# Patient Record
Sex: FEMALE | Race: WHITE | NOT HISPANIC OR LATINO | Employment: UNEMPLOYED | RURAL
[De-identification: names, ages, dates, MRNs, and addresses within clinical notes are randomized per-mention and may not be internally consistent; named-entity substitution may affect disease eponyms.]

---
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
## 2023-02-02 ENCOUNTER — OFFICE VISIT (OUTPATIENT)
Dept: FAMILY MEDICINE | Facility: CLINIC | Age: 18
End: 2023-02-02
Payer: MEDICAID

## 2023-02-02 VITALS
DIASTOLIC BLOOD PRESSURE: 79 MMHG | BODY MASS INDEX: 24.27 KG/M2 | OXYGEN SATURATION: 98 % | HEIGHT: 66 IN | SYSTOLIC BLOOD PRESSURE: 130 MMHG | TEMPERATURE: 99 F | WEIGHT: 151 LBS | HEART RATE: 100 BPM

## 2023-02-02 DIAGNOSIS — N39.0 URINARY TRACT INFECTION WITH HEMATURIA, SITE UNSPECIFIED: Primary | ICD-10-CM

## 2023-02-02 DIAGNOSIS — N89.8 VAGINAL ODOR: ICD-10-CM

## 2023-02-02 DIAGNOSIS — R31.9 URINARY TRACT INFECTION WITH HEMATURIA, SITE UNSPECIFIED: Primary | ICD-10-CM

## 2023-02-02 DIAGNOSIS — Z20.2 STD EXPOSURE: ICD-10-CM

## 2023-02-02 DIAGNOSIS — F32.1 CURRENT MODERATE EPISODE OF MAJOR DEPRESSIVE DISORDER, UNSPECIFIED WHETHER RECURRENT: ICD-10-CM

## 2023-02-02 LAB
BILIRUB SERPL-MCNC: NORMAL MG/DL
BILIRUB UR QL STRIP: NEGATIVE
BLOOD URINE, POC: NORMAL
CANDIDA SPECIES: NEGATIVE
CLARITY UR: ABNORMAL
COLOR UR: ABNORMAL
COLOR, POC UA: NORMAL
GARDNERELLA: POSITIVE
GLUCOSE UR QL STRIP: NORMAL
GLUCOSE UR STRIP-MCNC: NORMAL MG/DL
KETONES UR QL STRIP: NORMAL
KETONES UR STRIP-SCNC: NEGATIVE MG/DL
LEUKOCYTE ESTERASE UR QL STRIP: NEGATIVE
LEUKOCYTE ESTERASE URINE, POC: NORMAL
NITRITE UR QL STRIP: NEGATIVE
NITRITE, POC UA: NORMAL
PH UR STRIP: 6 PH UNITS
PH, POC UA: 6
PROT UR QL STRIP: 10
PROTEIN, POC: NORMAL
RBC # UR STRIP: NEGATIVE /UL
SP GR UR STRIP: 1.03
SPECIFIC GRAVITY, POC UA: 1.03
TRICHOMONAS: NEGATIVE
UROBILINOGEN UR STRIP-ACNC: NORMAL MG/DL
UROBILINOGEN, POC UA: 1

## 2023-02-02 PROCEDURE — 3075F SYST BP GE 130 - 139MM HG: CPT | Mod: CPTII,,, | Performed by: NURSE PRACTITIONER

## 2023-02-02 PROCEDURE — 99213 PR OFFICE/OUTPT VISIT, EST, LEVL III, 20-29 MIN: ICD-10-PCS | Mod: ,,, | Performed by: NURSE PRACTITIONER

## 2023-02-02 PROCEDURE — 81003 URINALYSIS, REFLEX TO URINE CULTURE: ICD-10-PCS | Mod: QW,,, | Performed by: CLINICAL MEDICAL LABORATORY

## 2023-02-02 PROCEDURE — 1159F PR MEDICATION LIST DOCUMENTED IN MEDICAL RECORD: ICD-10-PCS | Mod: CPTII,,, | Performed by: NURSE PRACTITIONER

## 2023-02-02 PROCEDURE — 87591 N.GONORRHOEAE DNA AMP PROB: CPT | Mod: ,,, | Performed by: CLINICAL MEDICAL LABORATORY

## 2023-02-02 PROCEDURE — 87491 CHLMYD TRACH DNA AMP PROBE: CPT | Mod: ,,, | Performed by: CLINICAL MEDICAL LABORATORY

## 2023-02-02 PROCEDURE — 3075F PR MOST RECENT SYSTOLIC BLOOD PRESS GE 130-139MM HG: ICD-10-PCS | Mod: CPTII,,, | Performed by: NURSE PRACTITIONER

## 2023-02-02 PROCEDURE — 87480 BACTERIAL VAGINOSIS: ICD-10-PCS | Mod: ,,, | Performed by: CLINICAL MEDICAL LABORATORY

## 2023-02-02 PROCEDURE — 1160F RVW MEDS BY RX/DR IN RCRD: CPT | Mod: CPTII,,, | Performed by: NURSE PRACTITIONER

## 2023-02-02 PROCEDURE — 1159F MED LIST DOCD IN RCRD: CPT | Mod: CPTII,,, | Performed by: NURSE PRACTITIONER

## 2023-02-02 PROCEDURE — 87591 CHLAMYDIA/GONORRHOEAE(GC), PCR: ICD-10-PCS | Mod: ,,, | Performed by: CLINICAL MEDICAL LABORATORY

## 2023-02-02 PROCEDURE — 87510 GARDNER VAG DNA DIR PROBE: CPT | Mod: ,,, | Performed by: CLINICAL MEDICAL LABORATORY

## 2023-02-02 PROCEDURE — 81003 URINALYSIS AUTO W/O SCOPE: CPT | Mod: RHCUB | Performed by: NURSE PRACTITIONER

## 2023-02-02 PROCEDURE — 1160F PR REVIEW ALL MEDS BY PRESCRIBER/CLIN PHARMACIST DOCUMENTED: ICD-10-PCS | Mod: CPTII,,, | Performed by: NURSE PRACTITIONER

## 2023-02-02 PROCEDURE — 99213 OFFICE O/P EST LOW 20 MIN: CPT | Mod: ,,, | Performed by: NURSE PRACTITIONER

## 2023-02-02 PROCEDURE — 3008F BODY MASS INDEX DOCD: CPT | Mod: CPTII,,, | Performed by: NURSE PRACTITIONER

## 2023-02-02 PROCEDURE — 87660 BACTERIAL VAGINOSIS: ICD-10-PCS | Mod: ,,, | Performed by: CLINICAL MEDICAL LABORATORY

## 2023-02-02 PROCEDURE — 3078F PR MOST RECENT DIASTOLIC BLOOD PRESSURE < 80 MM HG: ICD-10-PCS | Mod: CPTII,,, | Performed by: NURSE PRACTITIONER

## 2023-02-02 PROCEDURE — 81003 URINALYSIS AUTO W/O SCOPE: CPT | Mod: QW,,, | Performed by: CLINICAL MEDICAL LABORATORY

## 2023-02-02 PROCEDURE — 3078F DIAST BP <80 MM HG: CPT | Mod: CPTII,,, | Performed by: NURSE PRACTITIONER

## 2023-02-02 PROCEDURE — 87480 CANDIDA DNA DIR PROBE: CPT | Mod: ,,, | Performed by: CLINICAL MEDICAL LABORATORY

## 2023-02-02 PROCEDURE — 3008F PR BODY MASS INDEX (BMI) DOCUMENTED: ICD-10-PCS | Mod: CPTII,,, | Performed by: NURSE PRACTITIONER

## 2023-02-02 PROCEDURE — 87510 BACTERIAL VAGINOSIS: ICD-10-PCS | Mod: ,,, | Performed by: CLINICAL MEDICAL LABORATORY

## 2023-02-02 PROCEDURE — 87491 CHLAMYDIA/GONORRHOEAE(GC), PCR: ICD-10-PCS | Mod: ,,, | Performed by: CLINICAL MEDICAL LABORATORY

## 2023-02-02 PROCEDURE — 87660 TRICHOMONAS VAGIN DIR PROBE: CPT | Mod: ,,, | Performed by: CLINICAL MEDICAL LABORATORY

## 2023-02-02 RX ORDER — CITALOPRAM 20 MG/1
TABLET, FILM COATED ORAL
Qty: 173 TABLET | Refills: 0 | Status: SHIPPED | OUTPATIENT
Start: 2023-02-02 | End: 2023-07-26

## 2023-02-03 DIAGNOSIS — B96.89 BACTERIAL VAGINOSIS: Primary | ICD-10-CM

## 2023-02-03 DIAGNOSIS — N76.0 BACTERIAL VAGINOSIS: Primary | ICD-10-CM

## 2023-02-03 LAB
CHLAMYDIA BY PCR: NEGATIVE
N. GONORRHOEAE (GC) BY PCR: NEGATIVE

## 2023-02-03 RX ORDER — METRONIDAZOLE 500 MG/1
500 TABLET ORAL EVERY 12 HOURS
Qty: 14 TABLET | Refills: 0 | Status: SHIPPED | OUTPATIENT
Start: 2023-02-03 | End: 2023-02-10

## 2023-02-06 NOTE — PROGRESS NOTES
MADDIE Soriano   [place] [address] [place]  [address]     PATIENT NAME: Kezia Colon  : 2005  DATE: 23  MRN: [id number]      Billing Provider: MADDIE Soriano  Level of Service:   Patient PCP Information       Provider PCP Type    Primary Doctor No General            Reason for Visit / Chief Complaint: Exposure to STD (uti) and Urinary Tract Infection ([place], oder to urine since 2 days)       Update PCP  Update Chief Complaint         History of Present Illness / Problem Focused Workflow     Kezia Colon presents to the clinic with Exposure to STD (uti) and Urinary Tract Infection ([place], oder to urine since 2 days)     18 y/o female presents for burning with urination and vaginal odor x 2 days. Would also like to start on something for depression. She has felt depression for months-years, feels sad most days of the week, not interested in things she feels like she should be interested in, denies SI or HI.     Exposure to STD   The patient's primary symptoms include dysuria.   Urinary Tract Infection       Review of Systems     Review of Systems   Constitutional: Negative.    HENT: Negative.     Eyes: Negative.    Respiratory: Negative.     Cardiovascular: Negative.    Gastrointestinal: Negative.    Endocrine: Negative.    Genitourinary:  Positive for dysuria.        Vaginal odor   Musculoskeletal: Negative.    Integumentary:  Negative.   Neurological: Negative.    Psychiatric/Behavioral:  Positive for decreased concentration. Negative for suicidal ideas.    All other systems reviewed and are negative.     Medical / Social / Family History     Past Medical History:   Diagnosis Date    Depression        History reviewed. No pertinent surgical history.    Social History  Ms. Colon  reports that she has been smoking vaping with nicotine. She has never used smokeless tobacco. She reports current alcohol use. She reports current drug use. Drugs:  Methamphetamines and Marijuana.    Family History  Ms.'s Colon family history is not on file.    Medications and Allergies     Medications  No outpatient medications have been marked as taking for the 2/2/23 encounter (Office Visit) with MADDIE Soriano.       Allergies  Review of patient's allergies indicates:  No Known Allergies    Physical Examination     Vitals:    02/02/23 0946   BP: 130/79   Pulse: 100   Temp: 98.5 °F (36.9 °C)     Physical Exam  Vitals and nursing note reviewed.   Constitutional:       Appearance: Normal appearance. She is normal weight.   HENT:      Head: Normocephalic and atraumatic.      Mouth/Throat:      Mouth: Mucous membranes are moist.      Pharynx: Oropharynx is clear.   Eyes:      Extraocular Movements: Extraocular movements intact.      Conjunctiva/sclera: Conjunctivae normal.      Pupils: Pupils are equal, round, and reactive to light.   Cardiovascular:      Rate and Rhythm: Normal rate and regular rhythm.      Pulses: Normal pulses.      Heart sounds: Normal heart sounds.   Pulmonary:      Effort: Pulmonary effort is normal.      Breath sounds: Normal breath sounds.   Abdominal:      General: Abdomen is flat. Bowel sounds are normal.      Palpations: Abdomen is soft.   Musculoskeletal:         General: Normal range of motion.      Cervical back: Normal range of motion and neck supple.   Skin:     General: Skin is warm and dry.      Capillary Refill: Capillary refill takes less than 2 seconds.   Neurological:      General: No focal deficit present.      Mental Status: She is alert and oriented to person, place, and time. Mental status is at baseline.   Psychiatric:         Attention and Perception: Attention normal.         Mood and Affect: Mood normal. Affect is flat.         Behavior: Behavior normal.         Thought Content: Thought content normal.         Judgment: Judgment normal.            Assessment and Plan (including Health Maintenance)      Problem List  Smart  Sets  Document Outside HM   :    Plan:   Urinary tract infection with hematuria, site unspecified  -     POCT URINALYSIS W/O SCOPE  -     Chlamydia/GC, PCR; Future; Expected date: 02/02/2023    STD exposure  -     POCT URINALYSIS W/O SCOPE  -     Chlamydia/GC, PCR; Future; Expected date: 02/02/2023  -     Urinalysis, Reflex to Urine Culture; Future; Expected date: 02/02/2023  -     Bacterial Vaginosis; Future; Expected date: 02/02/2023    Vaginal odor  -     Bacterial Vaginosis; Future; Expected date: 02/02/2023    Current moderate episode of major depressive disorder, unspecified whether recurrent  -     citalopram (CELEXA) 20 MG tablet; Take 1 tablet (20 mg total) by mouth once daily for 7 days, THEN 2 tablets (40 mg total) once daily.  Dispense: 173 tablet; Refill: 0           Health Maintenance Due   Topic Date Due    Hepatitis C Screening  Never done    Lipid Panel  Never done    COVID-19 Vaccine (1) Never done    Pneumococcal Vaccines (Age 0-64) (1 - PCV) Never done    HPV Vaccines (1 - 2-dose series) Never done    HIV Screening  Never done    TETANUS VACCINE  Never done    Influenza Vaccine (1) Never done       Problem List Items Addressed This Visit    None  Visit Diagnoses       Urinary tract infection with hematuria, site unspecified    -  Primary    Relevant Orders    POCT URINALYSIS W/O SCOPE (Completed)    Chlamydia/GC, PCR (Completed)    STD exposure        Relevant Orders    POCT URINALYSIS W/O SCOPE (Completed)    Chlamydia/GC, PCR (Completed)    Urinalysis, Reflex to Urine Culture (Completed)    Bacterial Vaginosis (Completed)    Vaginal odor        Relevant Orders    Bacterial Vaginosis (Completed)    Current moderate episode of major depressive disorder, unspecified whether recurrent        Relevant Medications    citalopram (CELEXA) 20 MG tablet            The patient has no Health Maintenance topics of status Not Due    Future Appointments   Date Time Provider Department Center   3/2/2023  9:00  MADDIE [facility]        There are no Patient Instructions on file for this visit.  Follow up in about 1 month (around 3/2/2023) for Follow up.     Signature:  MADDIE Soriano      Date of encounter: 2/2/23

## 2023-07-26 ENCOUNTER — APPOINTMENT (OUTPATIENT)
Dept: RADIOLOGY | Facility: CLINIC | Age: 18
End: 2023-07-26
Attending: STUDENT IN AN ORGANIZED HEALTH CARE EDUCATION/TRAINING PROGRAM
Payer: MEDICAID

## 2023-07-26 ENCOUNTER — OFFICE VISIT (OUTPATIENT)
Dept: FAMILY MEDICINE | Facility: CLINIC | Age: 18
End: 2023-07-26
Payer: MEDICAID

## 2023-07-26 VITALS
BODY MASS INDEX: 23.3 KG/M2 | SYSTOLIC BLOOD PRESSURE: 104 MMHG | TEMPERATURE: 99 F | DIASTOLIC BLOOD PRESSURE: 75 MMHG | HEIGHT: 66 IN | WEIGHT: 145 LBS | OXYGEN SATURATION: 97 % | HEART RATE: 88 BPM

## 2023-07-26 DIAGNOSIS — M25.541 METACARPOPHALANGEAL JOINT PAIN OF RIGHT HAND: Primary | ICD-10-CM

## 2023-07-26 DIAGNOSIS — M25.541 METACARPOPHALANGEAL JOINT PAIN OF RIGHT HAND: ICD-10-CM

## 2023-07-26 PROCEDURE — 3078F DIAST BP <80 MM HG: CPT | Mod: CPTII,,, | Performed by: STUDENT IN AN ORGANIZED HEALTH CARE EDUCATION/TRAINING PROGRAM

## 2023-07-26 PROCEDURE — 3008F PR BODY MASS INDEX (BMI) DOCUMENTED: ICD-10-PCS | Mod: CPTII,,, | Performed by: STUDENT IN AN ORGANIZED HEALTH CARE EDUCATION/TRAINING PROGRAM

## 2023-07-26 PROCEDURE — 3074F PR MOST RECENT SYSTOLIC BLOOD PRESSURE < 130 MM HG: ICD-10-PCS | Mod: CPTII,,, | Performed by: STUDENT IN AN ORGANIZED HEALTH CARE EDUCATION/TRAINING PROGRAM

## 2023-07-26 PROCEDURE — 1160F RVW MEDS BY RX/DR IN RCRD: CPT | Mod: CPTII,,, | Performed by: STUDENT IN AN ORGANIZED HEALTH CARE EDUCATION/TRAINING PROGRAM

## 2023-07-26 PROCEDURE — 99213 PR OFFICE/OUTPT VISIT, EST, LEVL III, 20-29 MIN: ICD-10-PCS | Mod: ,,, | Performed by: STUDENT IN AN ORGANIZED HEALTH CARE EDUCATION/TRAINING PROGRAM

## 2023-07-26 PROCEDURE — 3008F BODY MASS INDEX DOCD: CPT | Mod: CPTII,,, | Performed by: STUDENT IN AN ORGANIZED HEALTH CARE EDUCATION/TRAINING PROGRAM

## 2023-07-26 PROCEDURE — 1159F PR MEDICATION LIST DOCUMENTED IN MEDICAL RECORD: ICD-10-PCS | Mod: CPTII,,, | Performed by: STUDENT IN AN ORGANIZED HEALTH CARE EDUCATION/TRAINING PROGRAM

## 2023-07-26 PROCEDURE — 1160F PR REVIEW ALL MEDS BY PRESCRIBER/CLIN PHARMACIST DOCUMENTED: ICD-10-PCS | Mod: CPTII,,, | Performed by: STUDENT IN AN ORGANIZED HEALTH CARE EDUCATION/TRAINING PROGRAM

## 2023-07-26 PROCEDURE — 3078F PR MOST RECENT DIASTOLIC BLOOD PRESSURE < 80 MM HG: ICD-10-PCS | Mod: CPTII,,, | Performed by: STUDENT IN AN ORGANIZED HEALTH CARE EDUCATION/TRAINING PROGRAM

## 2023-07-26 PROCEDURE — 73130 X-RAY EXAM OF HAND: CPT | Mod: TC,RHCUB,FY,RT | Performed by: STUDENT IN AN ORGANIZED HEALTH CARE EDUCATION/TRAINING PROGRAM

## 2023-07-26 PROCEDURE — 99213 OFFICE O/P EST LOW 20 MIN: CPT | Mod: ,,, | Performed by: STUDENT IN AN ORGANIZED HEALTH CARE EDUCATION/TRAINING PROGRAM

## 2023-07-26 PROCEDURE — 3074F SYST BP LT 130 MM HG: CPT | Mod: CPTII,,, | Performed by: STUDENT IN AN ORGANIZED HEALTH CARE EDUCATION/TRAINING PROGRAM

## 2023-07-26 PROCEDURE — 1159F MED LIST DOCD IN RCRD: CPT | Mod: CPTII,,, | Performed by: STUDENT IN AN ORGANIZED HEALTH CARE EDUCATION/TRAINING PROGRAM

## 2023-07-26 RX ORDER — MEDROXYPROGESTERONE ACETATE 150 MG/ML
150 INJECTION, SUSPENSION INTRAMUSCULAR
COMMUNITY

## 2023-07-26 NOTE — PROGRESS NOTES
"Subjective     Patient ID: Kezia Colon is a 17 y.o. female.    Chief Complaint: Hand Injury (Injured right hand in April 2023 playing football)    HPI    R 5th MCP joint pain and swelling that first started after catching a football in April. NV intact but swelling and pain has persisted and really interfering with her day to day activities. Worse with making a fist and using the hand in any repetitive way. No other joint pain anywhere else       Objective   /75   Pulse 88   Temp 98.5 °F (36.9 °C) (Oral)   Ht 5' 6" (1.676 m)   Wt 65.8 kg (145 lb)   LMP 06/26/2023   SpO2 97%   BMI 23.40 kg/m²     Physical Exam  Gen: Awake and alert. No apparent distress  HEENT:  NCAT, symmetric  Pulm: normal work of breathing, no accessory muscle use; speaking complete sentences without having to stop  MSK: 5th MCP tenderness. R hand swelling over the 5th metacarpal  Neuro: CN II-XII grossly normal   Psych: pleasant affect, congruent mood. Linear thought; good eye contact  SKIN: no rashes present on face, neck, hands       Assessment and Plan     Problem List Items Addressed This Visit    None  Visit Diagnoses       Metacarpophalangeal joint pain of right hand    -  Primary    Relevant Orders    X-Ray Hand 3 view Right          XR to evaluate for fracture. Has had persistent symptoms. Will also refer to [facility] for consideration of additional imaging and/or hand PT eval      "

## 2023-07-26 NOTE — PATIENT INSTRUCTIONS
- You can take advil or aleve as needed for pain and swelling. Try not to use this every single day as it may cause side effects. We are referring you to orthopedics for further evaluation since it has been bothering you for so long.

## 2024-03-25 ENCOUNTER — HOSPITAL ENCOUNTER (EMERGENCY)
Facility: HOSPITAL | Age: 19
Discharge: HOME | End: 2024-03-25
Payer: COMMERCIAL

## 2024-03-25 VITALS
RESPIRATION RATE: 18 BRPM | DIASTOLIC BLOOD PRESSURE: 82 MMHG | BODY MASS INDEX: 25.47 KG/M2 | HEART RATE: 111 BPM | WEIGHT: 138.38 LBS | HEIGHT: 62 IN | TEMPERATURE: 98 F | OXYGEN SATURATION: 100 % | SYSTOLIC BLOOD PRESSURE: 115 MMHG

## 2024-03-25 DIAGNOSIS — N30.00 ACUTE CYSTITIS WITHOUT HEMATURIA: Primary | ICD-10-CM

## 2024-03-25 LAB
BACTERIA #/AREA URNS HPF: ABNORMAL /HPF
BILIRUB UR QL STRIP: NEGATIVE
CLARITY UR: ABNORMAL
COLOR UR: YELLOW
GLUCOSE UR STRIP-MCNC: NEGATIVE MG/DL
HCG UR QL IA.RAPID: NEGATIVE
KETONES UR STRIP-SCNC: ABNORMAL MG/DL
LEUKOCYTE ESTERASE UR QL STRIP: ABNORMAL
NITRITE UR QL STRIP: POSITIVE
PH UR STRIP: 6 PH UNITS
PROT UR QL STRIP: NEGATIVE
RBC # UR STRIP: NEGATIVE /UL
RBC #/AREA URNS HPF: ABNORMAL /HPF
SP GR UR STRIP: 1.01
SQUAMOUS #/AREA URNS LPF: ABNORMAL /LPF
UROBILINOGEN UR STRIP-ACNC: 0.2 MG/DL
WBC #/AREA URNS HPF: ABNORMAL /HPF
YEAST #/AREA URNS HPF: ABNORMAL /HPF

## 2024-03-25 PROCEDURE — 63600175 PHARM REV CODE 636 W HCPCS

## 2024-03-25 PROCEDURE — 96372 THER/PROPH/DIAG INJ SC/IM: CPT

## 2024-03-25 PROCEDURE — 81025 URINE PREGNANCY TEST: CPT

## 2024-03-25 PROCEDURE — 81003 URINALYSIS AUTO W/O SCOPE: CPT

## 2024-03-25 PROCEDURE — 99284 EMERGENCY DEPT VISIT MOD MDM: CPT | Mod: ,,,

## 2024-03-25 PROCEDURE — 87086 URINE CULTURE/COLONY COUNT: CPT

## 2024-03-25 PROCEDURE — 99284 EMERGENCY DEPT VISIT MOD MDM: CPT | Mod: 25

## 2024-03-25 RX ORDER — CEFTRIAXONE 1 G/1
1 INJECTION, POWDER, FOR SOLUTION INTRAMUSCULAR; INTRAVENOUS
Status: COMPLETED | OUTPATIENT
Start: 2024-03-25 | End: 2024-03-25

## 2024-03-25 RX ORDER — KETOROLAC TROMETHAMINE 30 MG/ML
30 INJECTION, SOLUTION INTRAMUSCULAR; INTRAVENOUS
Status: COMPLETED | OUTPATIENT
Start: 2024-03-25 | End: 2024-03-25

## 2024-03-25 RX ORDER — PHENAZOPYRIDINE HYDROCHLORIDE 200 MG/1
200 TABLET, FILM COATED ORAL 3 TIMES DAILY PRN
Qty: 9 TABLET | Refills: 0 | Status: SHIPPED | OUTPATIENT
Start: 2024-03-25 | End: 2024-03-28

## 2024-03-25 RX ORDER — ONDANSETRON HYDROCHLORIDE 2 MG/ML
4 INJECTION, SOLUTION INTRAVENOUS
Status: COMPLETED | OUTPATIENT
Start: 2024-03-25 | End: 2024-03-25

## 2024-03-25 RX ORDER — NITROFURANTOIN 25; 75 MG/1; MG/1
100 CAPSULE ORAL 2 TIMES DAILY
Qty: 10 CAPSULE | Refills: 0 | Status: SHIPPED | OUTPATIENT
Start: 2024-03-25 | End: 2024-03-30

## 2024-03-25 RX ADMIN — CEFTRIAXONE 1 G: 1 INJECTION, POWDER, FOR SOLUTION INTRAMUSCULAR; INTRAVENOUS at 11:03

## 2024-03-25 RX ADMIN — ONDANSETRON 4 MG: 2 INJECTION INTRAMUSCULAR; INTRAVENOUS at 11:03

## 2024-03-25 RX ADMIN — KETOROLAC TROMETHAMINE 30 MG: 30 INJECTION, SOLUTION INTRAMUSCULAR at 11:03

## 2024-03-26 NOTE — ED NOTES
Patient discharged to home via private vehicle with friend, no signs of adverse reaction to IM meds, discharge instructions given with voiced understanding. QUIN

## 2024-03-26 NOTE — ED PROVIDER NOTES
Encounter Date: 3/25/2024       History     Chief Complaint   Patient presents with    Abdominal Pain     Low abd pain x2-3 days. Period ended 6 days ago.  Pt denies BC x2-3 months.  Pt denies vag bleeding/d/c/dysuria/fever/n/v/d.  NAD noted. Ua collected.       Patient is a 19 y/o  female with a PMHx of depression presents to the ED with c/o suprapubic abdominal pain for the past 2 days. Patient is just ending her period and is sexually active, endorses normal bowel movements, nausea and a 6/10 pain level.     The history is provided by the patient.   Abdominal Pain  The current episode started two days ago. The onset of the illness was gradual. The problem has been gradually worsening. The abdominal pain is located in the suprapubic region. The abdominal pain does not radiate. The severity of the abdominal pain is 6/10. The abdominal pain is relieved by nothing. The other symptoms of the illness do not include vaginal discharge or vaginal bleeding.   The patient has not had a change in bowel habit.     Review of patient's allergies indicates:  No Known Allergies  Past Medical History:   Diagnosis Date    Depression      No past surgical history on file.  No family history on file.  Social History     Tobacco Use    Smoking status: Every Day     Types: Vaping with nicotine    Smokeless tobacco: Never   Substance Use Topics    Alcohol use: Yes     Comment: pint per week    Drug use: Yes     Types: Methamphetamines, Marijuana     Review of Systems   Constitutional: Negative.    HENT: Negative.     Eyes: Negative.    Respiratory: Negative.     Cardiovascular: Negative.    Gastrointestinal:  Positive for abdominal pain.   Endocrine: Negative.    Genitourinary: Negative.  Negative for vaginal bleeding, vaginal discharge and vaginal pain.   Musculoskeletal: Negative.    Skin: Negative.    Allergic/Immunologic: Negative.    Neurological: Negative.    Hematological: Negative.    Psychiatric/Behavioral: Negative.          Physical Exam     Initial Vitals [03/25/24 2305]   BP Pulse Resp Temp SpO2   115/82 (!) 111 18 97.8 °F (36.6 °C) 100 %      MAP       --         Physical Exam    Nursing note and vitals reviewed.  Constitutional: Vital signs are normal. She appears well-developed and well-nourished. She is not diaphoretic. She is cooperative.  Non-toxic appearance. She does not have a sickly appearance. She does not appear ill. No distress.   Cardiovascular:  Normal rate, regular rhythm, S1 normal, S2 normal, normal heart sounds, intact distal pulses and normal pulses.     Exam reveals no gallop, no S3, no S4, no distant heart sounds and no friction rub.       No murmur heard.  No systolic murmur is present.  No diastolic murmur is present.  Pulmonary/Chest: Effort normal and breath sounds normal.   Abdominal: Abdomen is soft and flat. Bowel sounds are normal. There is generalized abdominal tenderness and tenderness in the suprapubic area. No hernia.     Lymphadenopathy:     She has no cervical adenopathy.     She has no axillary adenopathy.   Neurological: She is alert and oriented to person, place, and time. She has normal strength and normal reflexes. She displays normal reflexes. No cranial nerve deficit or sensory deficit. She displays a negative Romberg sign. GCS eye subscore is 4. GCS verbal subscore is 5. GCS motor subscore is 6.   Skin: Skin is warm, dry and intact. Capillary refill takes less than 2 seconds. No rash noted.   Psychiatric: She has a normal mood and affect. Her speech is normal and behavior is normal. Judgment and thought content normal. Cognition and memory are normal.         Medical Screening Exam   See Full Note    ED Course   Procedures  Labs Reviewed   URINALYSIS, REFLEX TO URINE CULTURE - Abnormal; Notable for the following components:       Result Value    Leukocytes, UA Moderate (*)     Nitrites, UA Positive (*)     All other components within normal limits   URINALYSIS, MICROSCOPIC - Abnormal;  Notable for the following components:    WBC, UA 20-50 (*)     Bacteria, UA Loaded (*)     All other components within normal limits   HCG QUALITATIVE URINE - Normal   CULTURE, URINE          Imaging Results    None          Medications   ketorolac injection 30 mg (has no administration in time range)   cefTRIAXone injection 1 g (has no administration in time range)   ondansetron injection 4 mg (has no administration in time range)     Medical Decision Making  The presentation is NOT consistent with pyelonephritis, sepsis, or sterile pyuria..    Given the large differential diagnosis, the decision making in this case is of high complexity.    After evaluating all of the data points in this case, the presentation of [unfilled] is NOT consistent with AAA; Mesenteric Ischemia; Bowel Perforation; Bowel Obstruction; Sigmoid Volvulus; Diverticulitis; Appendicitis; Peritonitis; Cholecystitis, ascending cholangitis or other gallbladder disease; perforated ulcer; significant GI bleeding, splenic rupture/infarction; Hepatic abscess; or other surgical/acute abdomen.    Similarly, this presentation is NOT consistent with ACS or Myocardial Ischemia or cardiac etiology; Pulmonary Embolism; fistula; incarcerated hernia; Pancreatitis, Aortic Dissection; Diabetic Ketoacidosis; Kidney Stone; Ischemic colitis; Psoas or other abscess; Methanol poisoning; Heavy metal toxicity; or porphyria.    Similarly, this case is NOT consistent with Owkh-Zpjm-Kxebea Syndrome, Ectopic Pregnancy, Placental Abruption, PID, Tubo-ovarian abscess, Ovarian Torsion, or STI.    Similarly, this presentation is NOT consistent with acute coronary syndrome, pulmonary embolism, dissection, borhaave's, arrythmia, pneumothorax, cardiac tamponade, or other emergent cardiopulmonary condition.    Similarly, this presentation is NOT consistent with pneumonia, or other focal bacterial infection.    Strict return and follow-up precautions have been given by me personally to  the patient/family/caregiver(s).    Data Reviewed/Counseling: I have reviewed the patient's vital signs, nursing notes, and other relevant tests/information. I had a detailed discussion regarding the historical points, exam findings, and any diagnostic results supporting the discharge diagnosis. I also discussed the need for outpatient follow-up and the need to return to the ED if symptoms worsen or if there are any questions or concerns that arise at home.      Amount and/or Complexity of Data Reviewed  Labs:  Decision-making details documented in ED Course.    Risk  Prescription drug management.               ED Course as of 03/25/24 2317   Mon Mar 25, 2024   2312 Leukocyte Esterase, UA(!): Moderate [AC]   2312 NITRITE UA(!): Positive [AC]   2314 Lab results reviewed by me and discussed with patient.Discharge instructions given along with strict return precautions, patient verbalizes understanding.   [AC]      ED Course User Index  [AC] Mitch Lima FNP                           Clinical Impression:   Final diagnoses:  [N30.00] Acute cystitis without hematuria (Primary)        ED Disposition Condition    Discharge Stable          ED Prescriptions       Medication Sig Dispense Start Date End Date Auth. Provider    nitrofurantoin, macrocrystal-monohydrate, (MACROBID) 100 MG capsule Take 1 capsule (100 mg total) by mouth 2 (two) times daily. for 5 days 10 capsule 3/25/2024 3/30/2024 Mitch Lima FNP    phenazopyridine (PYRIDIUM) 200 MG tablet Take 1 tablet (200 mg total) by mouth 3 (three) times daily as needed for Pain. 9 tablet 3/25/2024 3/28/2024 Mitch Lima FNP          Follow-up Information       Follow up With Specialties Details Why Contact Info    local pcp   If symptoms worsen, As needed              Mitch Lima FNP  03/25/24 2317

## 2024-03-26 NOTE — DISCHARGE INSTRUCTIONS
Take medication as directed by the label on the bottle, follow up with local pcp as needed if symptoms continue.

## 2024-03-28 LAB — UA COMPLETE W REFLEX CULTURE PNL UR: ABNORMAL

## 2024-04-25 ENCOUNTER — HOSPITAL ENCOUNTER (EMERGENCY)
Facility: HOSPITAL | Age: 19
Discharge: HOME | End: 2024-04-25

## 2024-04-25 VITALS
OXYGEN SATURATION: 98 % | BODY MASS INDEX: 25.18 KG/M2 | RESPIRATION RATE: 18 BRPM | HEART RATE: 100 BPM | TEMPERATURE: 99 F | DIASTOLIC BLOOD PRESSURE: 85 MMHG | SYSTOLIC BLOOD PRESSURE: 122 MMHG | WEIGHT: 142.13 LBS | HEIGHT: 63 IN

## 2024-04-25 DIAGNOSIS — K59.00 CONSTIPATION, UNSPECIFIED CONSTIPATION TYPE: Primary | ICD-10-CM

## 2024-04-25 DIAGNOSIS — R10.84 GENERALIZED ABDOMINAL PAIN: ICD-10-CM

## 2024-04-25 LAB
BILIRUB UR QL STRIP: NEGATIVE
CLARITY UR: CLEAR
COLOR UR: YELLOW
GLUCOSE UR STRIP-MCNC: NEGATIVE MG/DL
HCG UR QL IA.RAPID: NEGATIVE
KETONES UR STRIP-SCNC: NEGATIVE MG/DL
LEUKOCYTE ESTERASE UR QL STRIP: NEGATIVE
NITRITE UR QL STRIP: NEGATIVE
PH UR STRIP: 6.5 PH UNITS
PROT UR QL STRIP: NEGATIVE
RBC # UR STRIP: NEGATIVE /UL
SP GR UR STRIP: 1.02
UROBILINOGEN UR STRIP-ACNC: 1 MG/DL

## 2024-04-25 PROCEDURE — 25000003 PHARM REV CODE 250: Performed by: NURSE PRACTITIONER

## 2024-04-25 PROCEDURE — 81025 URINE PREGNANCY TEST: CPT | Performed by: NURSE PRACTITIONER

## 2024-04-25 PROCEDURE — 81003 URINALYSIS AUTO W/O SCOPE: CPT | Performed by: NURSE PRACTITIONER

## 2024-04-25 PROCEDURE — 99284 EMERGENCY DEPT VISIT MOD MDM: CPT | Mod: ,,, | Performed by: NURSE PRACTITIONER

## 2024-04-25 PROCEDURE — 99283 EMERGENCY DEPT VISIT LOW MDM: CPT | Mod: 25

## 2024-04-25 RX ORDER — SYRING-NEEDL,DISP,INSUL,0.3 ML 29 G X1/2"
296 SYRINGE, EMPTY DISPOSABLE MISCELLANEOUS
Status: COMPLETED | OUTPATIENT
Start: 2024-04-25 | End: 2024-04-25

## 2024-04-25 RX ADMIN — MAGNESIUM CITRATE 296 ML: 1.75 LIQUID ORAL at 02:04

## 2024-04-25 NOTE — ED PROVIDER NOTES
Encounter Date: 4/25/2024       History     Chief Complaint   Patient presents with    Abdominal Pain     dysuria     19 y/o WF presents pov with c/o abd pain with dysuria and brownish vaginal discharge x 1 week. Negative for n/v/d, constipation, or fever/chills. LMP one month ago. Started current menstrual period 2 days ago. Rates abd pain at it's worst 8/10.    The history is provided by the patient.     Review of patient's allergies indicates:  No Known Allergies  Past Medical History:   Diagnosis Date    Depression      No past surgical history on file.  No family history on file.  Social History     Tobacco Use    Smoking status: Every Day     Types: Vaping with nicotine    Smokeless tobacco: Never   Substance Use Topics    Alcohol use: Not Currently     Comment: pint per week    Drug use: Yes     Types: Methamphetamines, Marijuana     Review of Systems   Constitutional:  Negative for chills and fever.   HENT: Negative.     Respiratory: Negative.  Negative for apnea, cough, choking, chest tightness, shortness of breath, wheezing and stridor.    Cardiovascular: Negative.  Negative for chest pain, palpitations and leg swelling.   Gastrointestinal:  Positive for abdominal pain. Negative for abdominal distention, anal bleeding, blood in stool, constipation, diarrhea, nausea, rectal pain and vomiting.   Genitourinary:  Positive for dysuria, vaginal bleeding and vaginal discharge. Negative for decreased urine volume, difficulty urinating, dyspareunia, enuresis, flank pain, frequency, genital sores, hematuria, menstrual problem, pelvic pain, urgency and vaginal pain.   Musculoskeletal: Negative.    Neurological: Negative.    Psychiatric/Behavioral: Negative.     All other systems reviewed and are negative.      Physical Exam     Initial Vitals [04/25/24 1319]   BP Pulse Resp Temp SpO2   122/85 100 18 98.6 °F (37 °C) 98 %      MAP       --         Physical Exam    Nursing note and vitals reviewed.  Constitutional: She  appears well-developed and well-nourished. No distress.   Eyes: Conjunctivae and EOM are normal. No scleral icterus.   Cardiovascular:  Normal rate, regular rhythm, normal heart sounds and intact distal pulses.     Exam reveals no gallop and no friction rub.       No murmur heard.  Pulmonary/Chest: Breath sounds normal. No respiratory distress. She has no wheezes. She has no rhonchi. She has no rales. She exhibits no tenderness.   Abdominal: Abdomen is soft. Bowel sounds are normal. She exhibits no distension and no mass. There is abdominal tenderness. There is no rebound and no guarding.   Musculoskeletal:         General: No tenderness. Normal range of motion.     Neurological: She is alert and oriented to person, place, and time.   Skin: Skin is warm and dry. Capillary refill takes less than 2 seconds.   Psychiatric: She has a normal mood and affect. Her behavior is normal. Judgment and thought content normal.         Medical Screening Exam   See Full Note    ED Course   Procedures  Labs Reviewed   HCG QUALITATIVE URINE - Normal   URINALYSIS          Imaging Results              X-Ray Abdomen AP 1 View (KUB) (Final result)  Result time 04/25/24 14:10:32      Final result by Garrett Perdomo II, MD (04/25/24 14:10:32)                   Impression:      Increased stool volume in the colon, may indicate constipation.      Electronically signed by: Garrett Perdomo  Date:    04/25/2024  Time:    14:10               Narrative:    EXAMINATION:  XR ABDOMEN AP 1 VIEW    CLINICAL HISTORY:  Generalized abdominal pain    TECHNIQUE:  XR ABDOMEN AP 1 VIEW    COMPARISON:  None.    FINDINGS:  No free fluid or free air seen.  Increased stool volume is seen in the colon.  The bowel gas pattern otherwise appears within normal limits.  No abnormal calcifications are present.  No other abnormality is identified.                                       Medications   magnesium citrate solution 296 mL (has no administration in time  range)     Medical Decision Making  19 y/o WF presents pov with c/o abd pain with dysuria and brownish vaginal discharge x 1 week. Negative for n/v/d, constipation, or fever/chills. LMP one month ago. Started current menstrual period 2 days ago. Rates abd pain at it's worst 8/10.    Amount and/or Complexity of Data Reviewed  Labs: ordered.     Details: UA:  Urine Preg:  Radiology: ordered.     Details: KUB:                                      Clinical Impression:   Final diagnoses:  [R10.84] Generalized abdominal pain  [K59.00] Constipation, unspecified constipation type (Primary)        ED Disposition Condition    Discharge Stable          ED Prescriptions    None       Follow-up Information    None          Tae Arora FNP  04/25/24 0291

## 2024-04-25 NOTE — ED TRIAGE NOTES
18 YEAR OLD WNWD FEMALE PRESENTS TO ER WITH C/O ABD PAIN AND DYSURIA X ONE WEEK. PT STATES SHE IS HAVING A BROWNISH COLOR DISCHARGE. PT STATES SHE MAY BE PREGNANT. ALL VS ARE WNL, PT IS AA&O X 4.

## 2024-06-14 ENCOUNTER — HOSPITAL ENCOUNTER (EMERGENCY)
Facility: HOSPITAL | Age: 19
Discharge: HOME | End: 2024-06-14

## 2024-06-14 VITALS
RESPIRATION RATE: 16 BRPM | SYSTOLIC BLOOD PRESSURE: 123 MMHG | OXYGEN SATURATION: 99 % | HEIGHT: 63 IN | BODY MASS INDEX: 25.36 KG/M2 | HEART RATE: 102 BPM | WEIGHT: 143.13 LBS | TEMPERATURE: 98 F | DIASTOLIC BLOOD PRESSURE: 85 MMHG

## 2024-06-14 DIAGNOSIS — R31.9 URINARY TRACT INFECTION WITH HEMATURIA, SITE UNSPECIFIED: ICD-10-CM

## 2024-06-14 DIAGNOSIS — N39.0 URINARY TRACT INFECTION WITH HEMATURIA, SITE UNSPECIFIED: ICD-10-CM

## 2024-06-14 DIAGNOSIS — M25.511 ACUTE PAIN OF RIGHT SHOULDER: Primary | ICD-10-CM

## 2024-06-14 LAB
BACTERIA #/AREA URNS HPF: ABNORMAL /HPF
BILIRUB UR QL STRIP: NEGATIVE
CLARITY UR: CLEAR
COLOR UR: YELLOW
GLUCOSE UR STRIP-MCNC: NEGATIVE MG/DL
HCG UR QL IA.RAPID: NEGATIVE
KETONES UR STRIP-SCNC: 15 MG/DL
LEUKOCYTE ESTERASE UR QL STRIP: ABNORMAL
NITRITE UR QL STRIP: NEGATIVE
PH UR STRIP: 6 PH UNITS
PROT UR QL STRIP: ABNORMAL
RBC # UR STRIP: ABNORMAL /UL
RBC #/AREA URNS HPF: ABNORMAL /HPF
SP GR UR STRIP: 1.02
SQUAMOUS #/AREA URNS LPF: ABNORMAL /LPF
UROBILINOGEN UR STRIP-ACNC: 0.2 MG/DL
WBC #/AREA URNS HPF: ABNORMAL /HPF

## 2024-06-14 PROCEDURE — 81025 URINE PREGNANCY TEST: CPT | Performed by: NURSE PRACTITIONER

## 2024-06-14 PROCEDURE — 87086 URINE CULTURE/COLONY COUNT: CPT | Performed by: NURSE PRACTITIONER

## 2024-06-14 PROCEDURE — 99284 EMERGENCY DEPT VISIT MOD MDM: CPT | Mod: 25

## 2024-06-14 PROCEDURE — 63600175 PHARM REV CODE 636 W HCPCS: Performed by: NURSE PRACTITIONER

## 2024-06-14 PROCEDURE — 96372 THER/PROPH/DIAG INJ SC/IM: CPT | Performed by: NURSE PRACTITIONER

## 2024-06-14 PROCEDURE — 81003 URINALYSIS AUTO W/O SCOPE: CPT | Performed by: NURSE PRACTITIONER

## 2024-06-14 PROCEDURE — 99284 EMERGENCY DEPT VISIT MOD MDM: CPT | Mod: ,,, | Performed by: NURSE PRACTITIONER

## 2024-06-14 RX ORDER — NITROFURANTOIN 25; 75 MG/1; MG/1
100 CAPSULE ORAL 2 TIMES DAILY
Qty: 10 CAPSULE | Refills: 0 | Status: SHIPPED | OUTPATIENT
Start: 2024-06-14 | End: 2024-06-19

## 2024-06-14 RX ORDER — CYCLOBENZAPRINE HCL 5 MG
5 TABLET ORAL 3 TIMES DAILY PRN
Qty: 10 TABLET | Refills: 0 | Status: SHIPPED | OUTPATIENT
Start: 2024-06-14 | End: 2024-06-24

## 2024-06-14 RX ORDER — KETOROLAC TROMETHAMINE 30 MG/ML
15 INJECTION, SOLUTION INTRAMUSCULAR; INTRAVENOUS
Status: COMPLETED | OUTPATIENT
Start: 2024-06-14 | End: 2024-06-14

## 2024-06-14 RX ADMIN — KETOROLAC TROMETHAMINE 15 MG: 30 INJECTION, SOLUTION INTRAMUSCULAR at 10:06

## 2024-06-15 NOTE — DISCHARGE INSTRUCTIONS
Take medication as directed.  Follow up with your primary care provider in 2-3 days.  Return for worsening condition or emergency needs.

## 2024-06-15 NOTE — ED PROVIDER NOTES
Encounter Date: 6/14/2024       History     Chief Complaint   Patient presents with    Shoulder Pain     Rt shoulder pain, started yesterday afternoon.  Denies injury.  Pt took tylenol and advil 30 mins pta.  Hurts to move rue.  Pt states, last period was a month ago, first of the month.       18 yr old WF to ED with c/o right shoulder pain since yesterday.  Denies injury.  Reports woke from a nap yesterday with pain.      The history is provided by the patient.   Arm Injury   The incident occurred yesterday. The injury mechanism is unknown. She came to the ER via walk-in. There is an injury to the Right shoulder. There have been no prior injuries to these areas. She has been Behaving normally. She has received no recent medical care. She reports no foreign bodies present. Pertinent negatives include no numbness.     Review of patient's allergies indicates:  No Known Allergies  Past Medical History:   Diagnosis Date    Depression      History reviewed. No pertinent surgical history.  No family history on file.  Social History     Tobacco Use    Smoking status: Every Day     Types: Vaping with nicotine    Smokeless tobacco: Never   Substance Use Topics    Alcohol use: Not Currently     Comment: pint per week    Drug use: Yes     Types: Methamphetamines, Marijuana     Review of Systems   Constitutional: Negative.    Respiratory: Negative.     Cardiovascular: Negative.    Musculoskeletal:  Positive for arthralgias.        Right shoulder pain   Skin: Negative.    Neurological:  Negative for numbness.       Physical Exam     Initial Vitals [06/14/24 2222]   BP Pulse Resp Temp SpO2   123/85 102 16 98.3 °F (36.8 °C) 99 %      MAP       --         Physical Exam    Nursing note and vitals reviewed.  Constitutional: She appears well-developed and well-nourished.   Neck: Neck supple.   Cardiovascular:  Normal rate and regular rhythm.           Pulmonary/Chest: Breath sounds normal.   Musculoskeletal:         General:  Tenderness present. Normal range of motion.      Cervical back: Neck supple.     Neurological: She is alert.   Skin: Skin is warm and dry.   Psychiatric: She has a normal mood and affect.         Medical Screening Exam   See Full Note    ED Course   Procedures  Labs Reviewed   URINALYSIS - Abnormal; Notable for the following components:       Result Value    Leukocytes, UA Trace (*)     Protein, UA Trace (*)     Ketones, UA 15 (*)     Blood, UA Large (*)     All other components within normal limits   URINALYSIS, MICROSCOPIC - Abnormal; Notable for the following components:    WBC, UA 15-25 (*)     RBC, UA 25-50 (*)     Bacteria, UA Moderate (*)     Squamous Epithelial Cells, UA Few (*)     All other components within normal limits   HCG QUALITATIVE URINE - Normal   CULTURE, URINE          Imaging Results    None          Medications   ketorolac injection 15 mg (15 mg Intramuscular Given 6/14/24 2239)     Medical Decision Making  18 yr old WF to ED with c/o right shoulder pain since yesterday.  Denies injury.  Reports woke from a nap yesterday with pain.    Symptoms improved with toradol.  Will DC home    Amount and/or Complexity of Data Reviewed  Labs: ordered.    Risk  Prescription drug management.                                      Clinical Impression:   Final diagnoses:  [M25.511] Acute pain of right shoulder (Primary)  [N39.0, R31.9] Urinary tract infection with hematuria, site unspecified        ED Disposition Condition    Discharge Stable          ED Prescriptions       Medication Sig Dispense Start Date End Date Auth. Provider    nitrofurantoin, macrocrystal-monohydrate, (MACROBID) 100 MG capsule Take 1 capsule (100 mg total) by mouth 2 (two) times daily. for 5 days 10 capsule 6/14/2024 6/19/2024 Iris Dotson FNP    cyclobenzaprine (FLEXERIL) 5 MG tablet Take 1 tablet (5 mg total) by mouth 3 (three) times daily as needed for Muscle spasms. 10 tablet 6/14/2024 6/24/2024 Iris Dotson FNP           Follow-up Information       Follow up With Specialties Details Why Contact Info    Charity Anderson, [facility], Emergency Medicine In 3 days  [address] [facility] [place] [address]  [phone]               Iris Dotson, MADDIE  06/14/24 7932

## 2024-06-17 LAB — UA COMPLETE W REFLEX CULTURE PNL UR: NORMAL

## 2024-07-10 ENCOUNTER — HOSPITAL ENCOUNTER (EMERGENCY)
Facility: HOSPITAL | Age: 19
Discharge: HOME | End: 2024-07-10

## 2024-07-10 VITALS
TEMPERATURE: 98 F | HEART RATE: 106 BPM | WEIGHT: 140 LBS | DIASTOLIC BLOOD PRESSURE: 96 MMHG | HEIGHT: 66 IN | BODY MASS INDEX: 22.5 KG/M2 | SYSTOLIC BLOOD PRESSURE: 124 MMHG | RESPIRATION RATE: 16 BRPM | OXYGEN SATURATION: 99 %

## 2024-07-10 DIAGNOSIS — F19.10 SUBSTANCE ABUSE: ICD-10-CM

## 2024-07-10 DIAGNOSIS — F41.0 PANIC ATTACK: Primary | ICD-10-CM

## 2024-07-10 DIAGNOSIS — N39.0 URINARY TRACT INFECTION WITHOUT HEMATURIA, SITE UNSPECIFIED: ICD-10-CM

## 2024-07-10 LAB
AMPHET UR QL SCN: NEGATIVE
BACTERIA #/AREA URNS HPF: ABNORMAL /HPF
BARBITURATES UR QL SCN: NEGATIVE
BENZODIAZ METAB UR QL SCN: NEGATIVE
BILIRUB UR QL STRIP: NEGATIVE
CLARITY UR: CLEAR
COCAINE UR QL SCN: NEGATIVE
COLOR UR: YELLOW
GLUCOSE UR STRIP-MCNC: NEGATIVE MG/DL
HCG UR QL IA.RAPID: NEGATIVE
KETONES UR STRIP-SCNC: NEGATIVE MG/DL
LEUKOCYTE ESTERASE UR QL STRIP: ABNORMAL
MDA UR QL SCN: NEGATIVE
METHADONE UR QL SCN: NEGATIVE
METHAMPHET UR QL SCN: POSITIVE
NITRITE UR QL STRIP: NEGATIVE
OPIATES UR QL SCN: NEGATIVE
OXYCODONE UR QL SCN: NEGATIVE
PCP UR QL SCN: NEGATIVE
PH UR STRIP: >=9 PH UNITS
PROT UR QL STRIP: NEGATIVE
RBC # UR STRIP: NEGATIVE /UL
RBC #/AREA URNS HPF: ABNORMAL /HPF
SP GR UR STRIP: 1.01
SQUAMOUS #/AREA URNS LPF: ABNORMAL /LPF
THC UR QL SCN: NEGATIVE
TRICHOMONAS #/AREA URNS HPF: ABNORMAL /HPF
TRICYCLICS UR QL SCN: NEGATIVE
UROBILINOGEN UR STRIP-ACNC: 2 MG/DL
WBC #/AREA URNS HPF: ABNORMAL /HPF
YEAST #/AREA URNS HPF: ABNORMAL /HPF

## 2024-07-10 PROCEDURE — 81025 URINE PREGNANCY TEST: CPT | Performed by: NURSE PRACTITIONER

## 2024-07-10 PROCEDURE — 80305 DRUG TEST PRSMV DIR OPT OBS: CPT | Performed by: NURSE PRACTITIONER

## 2024-07-10 PROCEDURE — 87086 URINE CULTURE/COLONY COUNT: CPT | Performed by: NURSE PRACTITIONER

## 2024-07-10 PROCEDURE — 63600175 PHARM REV CODE 636 W HCPCS: Performed by: NURSE PRACTITIONER

## 2024-07-10 PROCEDURE — 99284 EMERGENCY DEPT VISIT MOD MDM: CPT | Mod: 25

## 2024-07-10 PROCEDURE — 81001 URINALYSIS AUTO W/SCOPE: CPT | Performed by: NURSE PRACTITIONER

## 2024-07-10 PROCEDURE — 81003 URINALYSIS AUTO W/O SCOPE: CPT | Performed by: NURSE PRACTITIONER

## 2024-07-10 PROCEDURE — 96372 THER/PROPH/DIAG INJ SC/IM: CPT | Performed by: NURSE PRACTITIONER

## 2024-07-10 RX ORDER — SULFAMETHOXAZOLE AND TRIMETHOPRIM 800; 160 MG/1; MG/1
1 TABLET ORAL 2 TIMES DAILY
Qty: 10 TABLET | Refills: 0 | Status: SHIPPED | OUTPATIENT
Start: 2024-07-10 | End: 2024-07-15

## 2024-07-10 RX ADMIN — LORAZEPAM 2 MG: 2 INJECTION INTRAMUSCULAR; INTRAVENOUS at 11:07

## 2024-07-10 NOTE — ED TRIAGE NOTES
Presents to ed per [facility] hand cuffed to stretcher with police at side c/o panic attack..hand cuffs removed per ambulance personal .Pt coached  to moved to ed stretcher. Aggressive at time.patient started yelling for mother that's at the end of bed.

## 2024-07-10 NOTE — ED NOTES
Refused lab work at time .notified she would need to stay 1 hour for observation per np.mother still in room at side.

## 2024-07-10 NOTE — ED PROVIDER NOTES
Encounter Date: 7/10/2024       History     Chief Complaint   Patient presents with    Panic Attack     Panic attack after smoking meth today. Brought in by ambulance    The history is provided by the patient.     Review of patient's allergies indicates:  No Known Allergies  History reviewed. No pertinent past medical history.  History reviewed. No pertinent surgical history.  No family history on file.  Social History     Tobacco Use    Smoking status: Every Day     Types: Vaping with nicotine    Smokeless tobacco: Never   Substance Use Topics    Alcohol use: Yes     Comment: beer/whiskey    Drug use: Yes     Types: Marijuana     Review of Systems   Psychiatric/Behavioral:  Positive for agitation. The patient is nervous/anxious.    All other systems reviewed and are negative.      Physical Exam     Initial Vitals   BP Pulse Resp Temp SpO2   07/10/24 1125 07/10/24 1125 07/10/24 1125 07/10/24 1202 07/10/24 1125   (!) 104/49 (!) 131 (!) 32 98.2 °F (36.8 °C) 99 %      MAP       --                Physical Exam    Constitutional: She appears well-developed and well-nourished.   HENT:   Head: Normocephalic.   Right Ear: External ear normal.   Left Ear: External ear normal.   Eyes: Pupils are equal, round, and reactive to light.   Neck:   Normal range of motion.  Cardiovascular:  Regular rhythm.           Tachy on arrival but returned to normal after ativan   Pulmonary/Chest: Breath sounds normal.   Abdominal: Abdomen is soft.   Musculoskeletal:         General: Normal range of motion.      Cervical back: Normal range of motion.     Neurological: She is alert and oriented to person, place, and time.   Skin: Skin is warm.   Psychiatric: She has a normal mood and affect.         Medical Screening Exam   See Full Note    ED Course   Procedures  Labs Reviewed   DRUG SCREEN, URINE (BEAKER) - Abnormal; Notable for the following components:       Result Value    Methamphetamines Positive (*)     All other components within normal  limits   URINALYSIS, REFLEX TO URINE CULTURE - Abnormal; Notable for the following components:    pH, UA >=9.0 (*)     Leukocytes, UA Large (*)     Urobilinogen, UA 2.0 (*)     All other components within normal limits   URINALYSIS, MICROSCOPIC - Abnormal; Notable for the following components:    WBC, UA 15-25 (*)     Bacteria, UA Few (*)     Squamous Epithelial Cells, UA Too Numerous To Count (*)     All other components within normal limits   HCG QUALITATIVE URINE - Normal   CULTURE, URINE          Imaging Results    None          Medications   LORazepam (ATIVAN) injection 2 mg (2 mg Intramuscular Given 7/10/24 1137)     Medical Decision Making  Panic attack after smoking meth today. Brought in by ambulance                                      Clinical Impression:   Final diagnoses:  [F41.0] Panic attack (Primary)  [F19.10] Substance abuse  [N39.0] Urinary tract infection without hematuria, site unspecified     Pt returned to baseline and observed for 90 mins in er.  Doing well. Discussed with her the importance of not using meth.  V/u   She refused lab draws.  Urine does show uti.  Will treat with bactrim ds po bid for 5 days   ED Disposition Condition    Discharge Stable          ED Prescriptions       Medication Sig Dispense Start Date End Date Auth. Provider    sulfamethoxazole-trimethoprim 800-160mg (BACTRIM DS) 800-160 mg Tab Take 1 tablet by mouth 2 (two) times daily. for 5 days 10 tablet 7/10/2024 7/15/2024 Ethan Marshall FNP          Follow-up Information    None          Ethan Marshall FNP  07/10/24 6786

## 2024-07-12 LAB — UA COMPLETE W REFLEX CULTURE PNL UR: NORMAL

## 2024-10-14 ENCOUNTER — HOSPITAL ENCOUNTER (EMERGENCY)
Facility: HOSPITAL | Age: 19
Discharge: HOME | End: 2024-10-14
Payer: MEDICAID

## 2024-10-14 VITALS
OXYGEN SATURATION: 100 % | HEIGHT: 64 IN | TEMPERATURE: 99 F | HEART RATE: 89 BPM | RESPIRATION RATE: 16 BRPM | SYSTOLIC BLOOD PRESSURE: 122 MMHG | WEIGHT: 151 LBS | BODY MASS INDEX: 25.78 KG/M2 | DIASTOLIC BLOOD PRESSURE: 74 MMHG

## 2024-10-14 DIAGNOSIS — R11.2 NAUSEA AND VOMITING, UNSPECIFIED VOMITING TYPE: Primary | ICD-10-CM

## 2024-10-14 PROCEDURE — 99284 EMERGENCY DEPT VISIT MOD MDM: CPT | Mod: ,,, | Performed by: NURSE PRACTITIONER

## 2024-10-14 PROCEDURE — 99283 EMERGENCY DEPT VISIT LOW MDM: CPT

## 2024-10-14 RX ORDER — ONDANSETRON 4 MG/1
4 TABLET, FILM COATED ORAL EVERY 6 HOURS
Qty: 12 TABLET | Refills: 0 | Status: SHIPPED | OUTPATIENT
Start: 2024-10-14

## 2024-10-14 NOTE — Clinical Note
"Kezia"Jeaneth Colon was seen and treated in our emergency department on 10/14/2024.  She may return to work on 10/15/2024.       If you have any questions or concerns, please don't hesitate to call.      Ethan Marshall, FNP"

## 2024-10-14 NOTE — ED PROVIDER NOTES
Encounter Date: 10/14/2024       History     Chief Complaint   Patient presents with    Nausea    Headache     Vomited yesterday x 2, none today.  Eating chips in waiting room.  States needs work excuse for today and is feeling better.  No fever    The history is provided by the patient.     Review of patient's allergies indicates:  No Known Allergies  Past Medical History:   Diagnosis Date    Depression      History reviewed. No pertinent surgical history.  No family history on file.  Social History     Tobacco Use    Smoking status: Every Day     Types: Vaping with nicotine    Smokeless tobacco: Never   Substance Use Topics    Alcohol use: Yes     Comment: beer/whiskey    Drug use: Yes     Types: Methamphetamines, Marijuana     Review of Systems   Gastrointestinal:  Positive for nausea and vomiting.   All other systems reviewed and are negative.      Physical Exam     Initial Vitals [10/14/24 1426]   BP Pulse Resp Temp SpO2   122/74 89 16 98.7 °F (37.1 °C) 100 %      MAP       --         Physical Exam    Constitutional: She appears well-developed and well-nourished.   HENT:   Head: Normocephalic.   Right Ear: External ear normal.   Left Ear: External ear normal. Mouth/Throat: Oropharynx is clear and moist.   Eyes: Pupils are equal, round, and reactive to light.   Neck:   Normal range of motion.  Cardiovascular:  Normal rate and regular rhythm.           Pulmonary/Chest: Breath sounds normal.   Abdominal: Abdomen is soft.   Musculoskeletal:         General: Normal range of motion.      Cervical back: Normal range of motion.     Skin: Skin is warm.   Psychiatric: She has a normal mood and affect.         Medical Screening Exam   See Full Note    ED Course   Procedures  Labs Reviewed - No data to display       Imaging Results    None          Medications - No data to display  Medical Decision Making  Vomited yesterday x 2, none today.  Eating chips in waiting room.  States needs work excuse for today and is feeling  better.  No fever    Risk  Prescription drug management.                                      Clinical Impression:   Final diagnoses:  [R11.2] Nausea and vomiting, unspecified vomiting type (Primary)        ED Disposition Condition    Discharge Stable          ED Prescriptions       Medication Sig Dispense Start Date End Date Auth. Provider    ondansetron (ZOFRAN) 4 MG tablet Take 1 tablet (4 mg total) by mouth every 6 (six) hours. 12 tablet 10/14/2024 -- Ethan Marshall FNP          Follow-up Information       Follow up With Specialties Details Why Contact Info    pcp   As needed              Ethan Marshall FNP  10/14/24 2195